# Patient Record
Sex: FEMALE | Race: WHITE | NOT HISPANIC OR LATINO | URBAN - METROPOLITAN AREA
[De-identification: names, ages, dates, MRNs, and addresses within clinical notes are randomized per-mention and may not be internally consistent; named-entity substitution may affect disease eponyms.]

---

## 2019-04-27 ENCOUNTER — EMERGENCY (EMERGENCY)
Facility: HOSPITAL | Age: 69
LOS: 1 days | Discharge: HOME | End: 2019-04-27
Attending: EMERGENCY MEDICINE | Admitting: EMERGENCY MEDICINE
Payer: MEDICARE

## 2019-04-27 VITALS
OXYGEN SATURATION: 100 % | TEMPERATURE: 98 F | RESPIRATION RATE: 14 BRPM | DIASTOLIC BLOOD PRESSURE: 74 MMHG | WEIGHT: 126.1 LBS | SYSTOLIC BLOOD PRESSURE: 136 MMHG | HEART RATE: 80 BPM

## 2019-04-27 DIAGNOSIS — Z90.710 ACQUIRED ABSENCE OF BOTH CERVIX AND UTERUS: Chronic | ICD-10-CM

## 2019-04-27 DIAGNOSIS — Z98.51 TUBAL LIGATION STATUS: Chronic | ICD-10-CM

## 2019-04-27 DIAGNOSIS — Z98.89 OTHER SPECIFIED POSTPROCEDURAL STATES: Chronic | ICD-10-CM

## 2019-04-27 DIAGNOSIS — Z90.49 ACQUIRED ABSENCE OF OTHER SPECIFIED PARTS OF DIGESTIVE TRACT: Chronic | ICD-10-CM

## 2019-04-27 DIAGNOSIS — Z87.898 PERSONAL HISTORY OF OTHER SPECIFIED CONDITIONS: Chronic | ICD-10-CM

## 2019-04-27 PROCEDURE — 99282 EMERGENCY DEPT VISIT SF MDM: CPT

## 2019-04-27 PROCEDURE — 99283 EMERGENCY DEPT VISIT LOW MDM: CPT

## 2019-04-27 NOTE — ED ADULT NURSE NOTE - NSIMPLEMENTINTERV_GEN_ALL_ED
Implemented All Universal Safety Interventions:  [place] to call system. Call bell, personal items and telephone within reach. Instruct patient to call for assistance. Room bathroom lighting operational. Non-slip footwear when patient is off stretcher. Physically safe environment: no spills, clutter or unnecessary equipment. Stretcher in lowest position, wheels locked, appropriate side rails in place.

## 2019-04-27 NOTE — ED ADULT NURSE NOTE - PMH
Diverticulitis large intestine    Diverticulosis    Hormone replacement therapy (HRT)    HTN (hypertension)    Hypothyroidism    Migraine    MVP (mitral valve prolapse)

## 2019-04-27 NOTE — ED PROVIDER NOTE - PSH
H/O abdominal hysterectomy  1992 and 1993 BSO  H/O shoulder surgery  2010  H/O urinary incontinence  TVT 2004  History of bilateral tubal ligation    History of laparoscopic cholecystectomy    S/P appendectomy  1962

## 2020-01-01 ENCOUNTER — EMERGENCY (EMERGENCY)
Facility: HOSPITAL | Age: 70
LOS: 1 days | Discharge: HOME | End: 2020-01-01
Attending: STUDENT IN AN ORGANIZED HEALTH CARE EDUCATION/TRAINING PROGRAM | Admitting: STUDENT IN AN ORGANIZED HEALTH CARE EDUCATION/TRAINING PROGRAM
Payer: MEDICARE

## 2020-01-01 VITALS
TEMPERATURE: 98 F | DIASTOLIC BLOOD PRESSURE: 75 MMHG | OXYGEN SATURATION: 96 % | RESPIRATION RATE: 15 BRPM | WEIGHT: 134.04 LBS | HEIGHT: 61 IN | HEART RATE: 77 BPM | SYSTOLIC BLOOD PRESSURE: 145 MMHG

## 2020-01-01 VITALS
DIASTOLIC BLOOD PRESSURE: 70 MMHG | OXYGEN SATURATION: 98 % | SYSTOLIC BLOOD PRESSURE: 134 MMHG | HEART RATE: 74 BPM | RESPIRATION RATE: 13 BRPM | TEMPERATURE: 97 F

## 2020-01-01 DIAGNOSIS — Z90.710 ACQUIRED ABSENCE OF BOTH CERVIX AND UTERUS: Chronic | ICD-10-CM

## 2020-01-01 DIAGNOSIS — Z87.898 PERSONAL HISTORY OF OTHER SPECIFIED CONDITIONS: Chronic | ICD-10-CM

## 2020-01-01 DIAGNOSIS — Z90.49 ACQUIRED ABSENCE OF OTHER SPECIFIED PARTS OF DIGESTIVE TRACT: Chronic | ICD-10-CM

## 2020-01-01 DIAGNOSIS — Z98.51 TUBAL LIGATION STATUS: Chronic | ICD-10-CM

## 2020-01-01 DIAGNOSIS — Z98.89 OTHER SPECIFIED POSTPROCEDURAL STATES: Chronic | ICD-10-CM

## 2020-01-01 PROCEDURE — 12001 RPR S/N/AX/GEN/TRNK 2.5CM/<: CPT

## 2020-01-01 PROCEDURE — 99282 EMERGENCY DEPT VISIT SF MDM: CPT

## 2020-01-01 PROCEDURE — 99282 EMERGENCY DEPT VISIT SF MDM: CPT | Mod: 25

## 2020-01-01 NOTE — ED PROVIDER NOTE - OBJECTIVE STATEMENT
68yo F pw lacerations. pt was cooking and cut her fingers with knife. left hand at base of third digit palmar surface and extends to proximal portion of 4th digit.   tetanus UTD> no numbness, no tingling, able to move all fingers

## 2020-01-01 NOTE — ED ADULT NURSE NOTE - NSIMPLEMENTINTERV_GEN_ALL_ED
Implemented All Universal Safety Interventions:  [place] to call system. Call bell, personal items and telephone within reach. Instruct patient to call for assistance. Room bathroom lighting operational. Non-slip footwear when patient is off stretcher. Physically safe environment: no spills, clutter or unnecessary equipment. Stretcher in lowest position, wheels locked, appropriate side rails in place.

## 2020-01-01 NOTE — ED PROVIDER NOTE - PATIENT PORTAL LINK FT
You can access the FollowMyHealth Patient Portal offered by [facility] by registering at the following website: [email or web address]. By joining [employer]’s FollowMyHealth portal, you will also be able to view your health information using other applications (apps) compatible with our system.

## 2020-01-01 NOTE — ED PROVIDER NOTE - NSFOLLOWUPINSTRUCTIONS_ED_ALL_ED_FT
1. TAKE ALL MEDICATIONS AS DIRECTED.    2. FOR PAIN OR FEVER YOU CAN TAKE IBUPROFEN (MOTRIN, ADVIL) OR ACETAMINOPHEN (TYLENOL) AS NEEDED, AS DIRECTED ON PACKAGING.  3. FOLLOW UP WITH YOUR PRIMARY DOCTOR WITHIN 5 DAYS AS DIRECTED.  4. IF YOU HAD LABS OR IMAGING DONE, YOU WERE GIVEN COPIES OF ALL LABS AND/OR IMAGING RESULTS FROM YOUR ER VISIT--PLEASE TAKE THEM WITH YOU TO YOUR FOLLOW UP APPOINTMENTS.  5. IF NEEDED, CALL PATIENT ACCESS SERVICES AT [phone] TO FIND A PRIMARY CARE PHYSICIAN.  OR CALL [phone] TO MAKE AN APPOINTMENT WITH THE CLINIC.  6. RETURN TO THE ER FOR ANY WORSENING SYMPTOMS OR CONCERNS.    Keep hand dry. apply bacitracin daily. Return for signs of infections  Return in 10-14 days for suture removal

## 2020-01-01 NOTE — ED PROVIDER NOTE - PHYSICAL EXAMINATION
Gen:  Well appear[place] in NAD  Head:  NC/AT  Resp: No distress   Ext: no deformities  Skin: left hand laceration 2 cm laceration extending from base of third digit on palmar aspect and extending betw finger and to dorsal aspect of distal 4th digit   + sensation intact, + flexion and extension intact

## 2020-01-16 ENCOUNTER — EMERGENCY (EMERGENCY)
Facility: HOSPITAL | Age: 70
LOS: 1 days | Discharge: HOME | End: 2020-01-16
Attending: EMERGENCY MEDICINE | Admitting: EMERGENCY MEDICINE
Payer: MEDICARE

## 2020-01-16 VITALS
OXYGEN SATURATION: 98 % | HEART RATE: 66 BPM | RESPIRATION RATE: 14 BRPM | TEMPERATURE: 98 F | DIASTOLIC BLOOD PRESSURE: 66 MMHG | SYSTOLIC BLOOD PRESSURE: 110 MMHG

## 2020-01-16 VITALS
HEIGHT: 62 IN | TEMPERATURE: 98 F | SYSTOLIC BLOOD PRESSURE: 104 MMHG | WEIGHT: 136.03 LBS | OXYGEN SATURATION: 95 % | RESPIRATION RATE: 18 BRPM | DIASTOLIC BLOOD PRESSURE: 65 MMHG | HEART RATE: 67 BPM

## 2020-01-16 DIAGNOSIS — Z90.710 ACQUIRED ABSENCE OF BOTH CERVIX AND UTERUS: Chronic | ICD-10-CM

## 2020-01-16 DIAGNOSIS — Z90.49 ACQUIRED ABSENCE OF OTHER SPECIFIED PARTS OF DIGESTIVE TRACT: Chronic | ICD-10-CM

## 2020-01-16 DIAGNOSIS — Z98.51 TUBAL LIGATION STATUS: Chronic | ICD-10-CM

## 2020-01-16 DIAGNOSIS — Z87.898 PERSONAL HISTORY OF OTHER SPECIFIED CONDITIONS: Chronic | ICD-10-CM

## 2020-01-16 DIAGNOSIS — Z98.89 OTHER SPECIFIED POSTPROCEDURAL STATES: Chronic | ICD-10-CM

## 2020-01-16 PROCEDURE — G0463: CPT

## 2020-01-16 NOTE — ED PROVIDER NOTE - CLINICAL SUMMARY MEDICAL DECISION MAKING FREE TEXT BOX
Pt is a 70 yo female here for suture removal cdi 7 sutures removed without issue possible 2 fell out no signs of infection

## 2020-01-16 NOTE — ED PROVIDER NOTE - OBJECTIVE STATEMENT
Pt is a 68 yo female with pmhx Diverticulitis large intestine HTN Hypothyroidism Migraine MVP here for suture removal. pt was cooking and cut her fingers with knife. left hand at base of third digit palmar surface and extends to proximal portion of 4th digit.  tetanus UTD> no numbness, no tingling, able to move all fingers no redness drainage fever sutures placed 1/1/20

## 2020-01-16 NOTE — ED ADULT TRIAGE NOTE - CHIEF COMPLAINT QUOTE
Pt reports that she needs her 9 stitches removed from her left hand which were placed on January 1. She denies pain, denies redness or swelling.

## 2020-01-16 NOTE — ED PROVIDER NOTE - ATTENDING CONTRIBUTION TO CARE
I have personally performed a face to face diagnostic evaluation on this patient.  I have reviewed the PA note and agree with the history, exam, and plan of care, except as noted.  History and Exam by me shows  here for suture removal c no other complaints.  Left hand wound c/d/i.  Sutures removed by PA

## 2020-01-16 NOTE — ED PROVIDER NOTE - PATIENT PORTAL LINK FT
You can access the FollowMyHealth Patient Portal offered by [facility] by registering at the following website: [email or web address]. By joining [email or web address]’s FollowMyHealth portal, you will also be able to view your health information using other applications (apps) compatible with our system.

## 2020-01-16 NOTE — ED PROVIDER NOTE - MUSCULOSKELETAL, MLM
Spine appears normal, range of motion is not limited, no muscle or joint tenderness left hand laceration 2 cm laceration extending from base of third digit on palmar aspect and extending between finger and to dorsal aspect of distal 4th digit cdi no redness drainage 7 sutures in place

## 2020-01-24 DIAGNOSIS — I34.1 NONRHEUMATIC MITRAL (VALVE) PROLAPSE: ICD-10-CM

## 2020-01-24 DIAGNOSIS — Z48.02 ENCOUNTER FOR REMOVAL OF SUTURES: ICD-10-CM

## 2020-01-24 DIAGNOSIS — E03.9 HYPOTHYROIDISM, UNSPECIFIED: ICD-10-CM

## 2020-01-24 DIAGNOSIS — K57.90 DIVERTICULOSIS OF INTESTINE, PART UNSPECIFIED, WITHOUT PERFORATION OR ABSCESS WITHOUT BLEEDING: ICD-10-CM

## 2020-01-24 DIAGNOSIS — I10 ESSENTIAL (PRIMARY) HYPERTENSION: ICD-10-CM

## 2020-01-24 DIAGNOSIS — Z88.0 ALLERGY STATUS TO PENICILLIN: ICD-10-CM

## 2022-11-08 ENCOUNTER — OFFICE (OUTPATIENT)
Dept: URBAN - METROPOLITAN AREA CLINIC 109 | Facility: CLINIC | Age: 72
Setting detail: OPHTHALMOLOGY
End: 2022-11-08
Payer: MEDICARE

## 2022-11-08 DIAGNOSIS — H02.832: ICD-10-CM

## 2022-11-08 DIAGNOSIS — H25.13: ICD-10-CM

## 2022-11-08 DIAGNOSIS — H02.835: ICD-10-CM

## 2022-11-08 DIAGNOSIS — H02.831: ICD-10-CM

## 2022-11-08 DIAGNOSIS — H16.221: ICD-10-CM

## 2022-11-08 DIAGNOSIS — H16.223: ICD-10-CM

## 2022-11-08 DIAGNOSIS — H02.834: ICD-10-CM

## 2022-11-08 DIAGNOSIS — H16.222: ICD-10-CM

## 2022-11-08 DIAGNOSIS — Z79.899: ICD-10-CM

## 2022-11-08 PROCEDURE — 92004 COMPRE OPH EXAM NEW PT 1/>: CPT | Performed by: OPHTHALMOLOGY

## 2022-11-08 PROCEDURE — 92250 FUNDUS PHOTOGRAPHY W/I&R: CPT | Performed by: OPHTHALMOLOGY

## 2022-11-08 PROCEDURE — 92134 CPTRZ OPH DX IMG PST SGM RTA: CPT | Performed by: OPHTHALMOLOGY

## 2022-11-08 ASSESSMENT — REFRACTION_CURRENTRX
OS_OVR_VA: 20/
OD_ADD: +2.50
OD_AXIS: 95
OS_AXIS: 30
OS_CYLINDER: -0.25
OS_ADD: +2.50
OD_SPHERE: +1.25
OD_OVR_VA: 20/
OD_CYLINDER: -0.50
OS_SPHERE: +1.25

## 2022-11-08 ASSESSMENT — REFRACTION_AUTOREFRACTION
OD_CYLINDER: -1.00
OS_SPHERE: +2.75
OD_AXIS: 96
OS_AXIS: 78
OS_CYLINDER: -0.25
OD_SPHERE: +3.25

## 2022-11-08 ASSESSMENT — CONFRONTATIONAL VISUAL FIELD TEST (CVF)
OS_FINDINGS: FULL
OD_FINDINGS: FULL

## 2022-11-08 ASSESSMENT — SPHEQUIV_DERIVED
OD_SPHEQUIV: 3
OS_SPHEQUIV: 2.625
OD_SPHEQUIV: 2.75

## 2022-11-08 ASSESSMENT — REFRACTION_MANIFEST
OD_SPHERE: +3.25
OD_AXIS: 95
OD_VA1: 20/20
OD_CYLINDER: -0.50
OS_ADD: +2.50
OD_ADD: +2.50
OS_VA1: 20/20
OS_SPHERE: +2.25

## 2022-11-08 ASSESSMENT — TONOMETRY
OS_IOP_MMHG: 17
OD_IOP_MMHG: 14

## 2022-11-08 ASSESSMENT — VISUAL ACUITY
OS_BCVA: 20/30
OD_BCVA: 20/25-2

## 2023-01-24 NOTE — ED ADULT NURSE NOTE - CADM POA URETHRAL CATHETER
Group Topic:  Group Psychotherapy    Date: 1/24/2023  Start Time: 11:00 AM  End Time: 11:50 AM  Facilitators: Lotus Tripathi [facility]    Focus:  Group Therapy  Number in attendance: 4  Session held virtually due to covid19 pandemic.   Group Therapy: Provide client with opportunity to explore and focus on life issues; identify coping skills, concerns related to mental health and receive feedback from others in group.       Method: Group  Attendance: Present  Participation: Active  Patient Response: Attentive  Mood: Normal  Affect: Type: Euthymic (normal mood)   Range: Restricted   Congruency: Congruent   Stability: Stable  Behavior/Socialization: Appropriate to group and Cooperative  Thought Process: Focused  Task Performance: Follows directions  Additional Information:  Psychosocial Stressors: Academic/Career and Interpersonal conflict  Symptom Notations: anxious thoughts, negative self talk  Patient Evaluation: Encouragement - needs prompts  Pt checked in to group with \"grateful\".  Pt shared feeling grateful for previous group and reported group as helpful due to being prompted to focus on pt's positive attributes challenging negative core beliefs.  Pt shared \"seeing things half full\" following previous group and reported noting progress in program.  Pt was also active in discussing hx of equating self worth with job/career and shared goal to reframe this.  Pt was active in giving support and feedback to others in group and encouraged others to be vulnerable.      No

## 2023-02-21 ENCOUNTER — OFFICE (OUTPATIENT)
Dept: URBAN - METROPOLITAN AREA CLINIC 109 | Facility: CLINIC | Age: 73
Setting detail: OPHTHALMOLOGY
End: 2023-02-21
Payer: MEDICARE

## 2023-02-21 DIAGNOSIS — H02.835: ICD-10-CM

## 2023-02-21 DIAGNOSIS — H16.221: ICD-10-CM

## 2023-02-21 DIAGNOSIS — H16.223: ICD-10-CM

## 2023-02-21 DIAGNOSIS — Z79.899: ICD-10-CM

## 2023-02-21 DIAGNOSIS — H02.834: ICD-10-CM

## 2023-02-21 DIAGNOSIS — H25.13: ICD-10-CM

## 2023-02-21 DIAGNOSIS — H16.222: ICD-10-CM

## 2023-02-21 DIAGNOSIS — H02.832: ICD-10-CM

## 2023-02-21 DIAGNOSIS — H02.831: ICD-10-CM

## 2023-02-21 PROCEDURE — 99213 OFFICE O/P EST LOW 20 MIN: CPT | Performed by: OPHTHALMOLOGY

## 2023-02-21 PROCEDURE — 92083 EXTENDED VISUAL FIELD XM: CPT | Performed by: OPHTHALMOLOGY

## 2023-02-21 ASSESSMENT — REFRACTION_MANIFEST
OD_CYLINDER: -0.50
OD_VA1: 20/20
OD_SPHERE: +3.25
OD_AXIS: 95
OD_ADD: +2.50
OS_VA1: 20/20
OS_SPHERE: +2.25
OS_ADD: +2.50

## 2023-02-21 ASSESSMENT — REFRACTION_CURRENTRX
OD_OVR_VA: 20/
OD_SPHERE: +1.25
OS_AXIS: 30
OD_CYLINDER: -0.50
OD_AXIS: 95
OS_SPHERE: +1.25
OS_CYLINDER: -0.25
OD_ADD: +2.50
OS_OVR_VA: 20/
OS_ADD: +2.50

## 2023-02-21 ASSESSMENT — REFRACTION_AUTOREFRACTION
OS_CYLINDER: -0.50
OD_CYLINDER: -0.50
OD_SPHERE: +2.75
OD_AXIS: 085
OS_SPHERE: +3.00
OS_AXIS: 085

## 2023-02-21 ASSESSMENT — TONOMETRY
OS_IOP_MMHG: 14
OD_IOP_MMHG: 13

## 2023-02-21 ASSESSMENT — CONFRONTATIONAL VISUAL FIELD TEST (CVF)
OS_FINDINGS: FULL
OD_FINDINGS: FULL

## 2023-02-21 ASSESSMENT — SPHEQUIV_DERIVED
OS_SPHEQUIV: 2.75
OD_SPHEQUIV: 3
OD_SPHEQUIV: 2.5

## 2023-02-21 ASSESSMENT — VISUAL ACUITY
OD_BCVA: 20/20-1
OS_BCVA: 20/20-1

## 2023-06-20 ENCOUNTER — OFFICE (OUTPATIENT)
Dept: URBAN - METROPOLITAN AREA CLINIC 109 | Facility: CLINIC | Age: 73
Setting detail: OPHTHALMOLOGY
End: 2023-06-20
Payer: MEDICARE

## 2023-06-20 DIAGNOSIS — H02.835: ICD-10-CM

## 2023-06-20 DIAGNOSIS — H02.834: ICD-10-CM

## 2023-06-20 DIAGNOSIS — H25.13: ICD-10-CM

## 2023-06-20 DIAGNOSIS — H16.223: ICD-10-CM

## 2023-06-20 DIAGNOSIS — H02.832: ICD-10-CM

## 2023-06-20 DIAGNOSIS — Z79.899: ICD-10-CM

## 2023-06-20 DIAGNOSIS — H16.222: ICD-10-CM

## 2023-06-20 DIAGNOSIS — H16.221: ICD-10-CM

## 2023-06-20 DIAGNOSIS — H02.831: ICD-10-CM

## 2023-06-20 PROCEDURE — 99213 OFFICE O/P EST LOW 20 MIN: CPT | Performed by: OPHTHALMOLOGY

## 2023-06-20 ASSESSMENT — REFRACTION_CURRENTRX
OD_OVR_VA: 20/
OD_ADD: +2.50
OD_AXIS: 95
OS_AXIS: 30
OS_SPHERE: +1.25
OS_ADD: +2.50
OS_CYLINDER: -0.25
OD_SPHERE: +1.25
OD_CYLINDER: -0.50
OS_OVR_VA: 20/

## 2023-06-20 ASSESSMENT — REFRACTION_MANIFEST
OD_AXIS: 95
OD_ADD: +2.50
OS_ADD: +2.50
OD_CYLINDER: -0.50
OD_SPHERE: +3.25
OS_SPHERE: +2.25
OD_VA1: 20/20
OS_VA1: 20/20

## 2023-06-20 ASSESSMENT — REFRACTION_AUTOREFRACTION
OD_CYLINDER: -1.00
OS_CYLINDER: -0.75
OS_AXIS: 88
OD_AXIS: 95
OD_SPHERE: +3.50
OS_SPHERE: +3.25

## 2023-06-20 ASSESSMENT — SPHEQUIV_DERIVED
OS_SPHEQUIV: 2.875
OD_SPHEQUIV: 3
OD_SPHEQUIV: 3

## 2023-06-20 ASSESSMENT — CONFRONTATIONAL VISUAL FIELD TEST (CVF)
OS_FINDINGS: FULL
OD_FINDINGS: FULL

## 2023-06-20 ASSESSMENT — TONOMETRY
OD_IOP_MMHG: 15
OS_IOP_MMHG: 15

## 2023-06-20 ASSESSMENT — VISUAL ACUITY
OD_BCVA: 20/20-2
OS_BCVA: 20/20-1

## 2024-05-29 ENCOUNTER — OFFICE (OUTPATIENT)
Dept: URBAN - METROPOLITAN AREA CLINIC 109 | Facility: CLINIC | Age: 74
Setting detail: OPHTHALMOLOGY
End: 2024-05-29
Payer: MEDICARE

## 2024-05-29 DIAGNOSIS — H02.831: ICD-10-CM

## 2024-05-29 DIAGNOSIS — H16.222: ICD-10-CM

## 2024-05-29 DIAGNOSIS — H16.221: ICD-10-CM

## 2024-05-29 DIAGNOSIS — H02.835: ICD-10-CM

## 2024-05-29 DIAGNOSIS — H02.832: ICD-10-CM

## 2024-05-29 DIAGNOSIS — Z79.899: ICD-10-CM

## 2024-05-29 DIAGNOSIS — H25.13: ICD-10-CM

## 2024-05-29 DIAGNOSIS — H02.834: ICD-10-CM

## 2024-05-29 DIAGNOSIS — H16.223: ICD-10-CM

## 2024-05-29 PROCEDURE — 92250 FUNDUS PHOTOGRAPHY W/I&R: CPT | Performed by: OPHTHALMOLOGY

## 2024-05-29 PROCEDURE — 92134 CPTRZ OPH DX IMG PST SGM RTA: CPT | Performed by: OPHTHALMOLOGY

## 2024-05-29 PROCEDURE — 92014 COMPRE OPH EXAM EST PT 1/>: CPT | Performed by: OPHTHALMOLOGY

## 2024-05-29 ASSESSMENT — CONFRONTATIONAL VISUAL FIELD TEST (CVF)
OD_FINDINGS: FULL
OS_FINDINGS: FULL

## 2024-11-20 ENCOUNTER — OFFICE (OUTPATIENT)
Dept: URBAN - METROPOLITAN AREA CLINIC 109 | Facility: CLINIC | Age: 74
Setting detail: OPHTHALMOLOGY
End: 2024-11-20
Payer: MEDICARE

## 2024-11-20 DIAGNOSIS — H16.223: ICD-10-CM

## 2024-11-20 DIAGNOSIS — H02.831: ICD-10-CM

## 2024-11-20 DIAGNOSIS — H02.832: ICD-10-CM

## 2024-11-20 DIAGNOSIS — H16.221: ICD-10-CM

## 2024-11-20 DIAGNOSIS — Z79.899: ICD-10-CM

## 2024-11-20 DIAGNOSIS — H25.13: ICD-10-CM

## 2024-11-20 DIAGNOSIS — H16.222: ICD-10-CM

## 2024-11-20 DIAGNOSIS — H02.835: ICD-10-CM

## 2024-11-20 DIAGNOSIS — H02.834: ICD-10-CM

## 2024-11-20 PROCEDURE — 92083 EXTENDED VISUAL FIELD XM: CPT | Performed by: OPHTHALMOLOGY

## 2024-11-20 PROCEDURE — 92014 COMPRE OPH EXAM EST PT 1/>: CPT | Performed by: OPHTHALMOLOGY

## 2024-11-20 ASSESSMENT — TONOMETRY
OD_IOP_MMHG: 19
OS_IOP_MMHG: 16
OS_IOP_MMHG: 14
OD_IOP_MMHG: 16

## 2024-11-20 ASSESSMENT — CONFRONTATIONAL VISUAL FIELD TEST (CVF)
OS_FINDINGS: FULL
OD_FINDINGS: FULL

## 2024-11-20 ASSESSMENT — REFRACTION_CURRENTRX
OD_CYLINDER: -0.50
OD_OVR_VA: 20/
OS_ADD: +2.50
OD_SPHERE: +1.25
OS_OVR_VA: 20/
OS_CYLINDER: -0.25
OD_AXIS: 95
OS_AXIS: 30
OS_SPHERE: +1.25
OD_ADD: +2.50

## 2024-11-20 ASSESSMENT — REFRACTION_AUTOREFRACTION
OD_AXIS: 077
OD_CYLINDER: -0.75
OS_AXIS: 084
OD_SPHERE: +3.00
OS_SPHERE: +3.25
OS_CYLINDER: -1.25

## 2024-11-20 ASSESSMENT — REFRACTION_MANIFEST
OD_VA1: 20/20
OD_CYLINDER: -0.50
OD_SPHERE: +3.25
OD_AXIS: 95
OS_ADD: +2.50
OD_ADD: +2.50
OS_VA1: 20/20
OS_SPHERE: +2.25

## 2024-11-20 ASSESSMENT — VISUAL ACUITY
OS_BCVA: 20/25+2
OD_BCVA: 20/20-1

## 2025-01-09 NOTE — ED ADULT NURSE NOTE - CAS EDN INTEG ASSESS
Performing Laboratory: 0 Billing Type: Third-Party Bill Bill For Surgical Tray: no Expected Date Of Service: 01/09/2025 - - -

## 2025-06-04 ENCOUNTER — OFFICE (OUTPATIENT)
Dept: URBAN - METROPOLITAN AREA CLINIC 109 | Facility: CLINIC | Age: 75
Setting detail: OPHTHALMOLOGY
End: 2025-06-04
Payer: MEDICARE

## 2025-06-04 DIAGNOSIS — H02.832: ICD-10-CM

## 2025-06-04 DIAGNOSIS — H52.4: ICD-10-CM

## 2025-06-04 DIAGNOSIS — Z79.899: ICD-10-CM

## 2025-06-04 DIAGNOSIS — H25.13: ICD-10-CM

## 2025-06-04 DIAGNOSIS — H02.831: ICD-10-CM

## 2025-06-04 DIAGNOSIS — R94.6: ICD-10-CM

## 2025-06-04 DIAGNOSIS — H16.223: ICD-10-CM

## 2025-06-04 PROBLEM — H52.7 REFRACTIVE ERROR: Status: ACTIVE | Noted: 2025-06-04

## 2025-06-04 PROCEDURE — 92250 FUNDUS PHOTOGRAPHY W/I&R: CPT | Performed by: OPHTHALMOLOGY

## 2025-06-04 PROCEDURE — 92014 COMPRE OPH EXAM EST PT 1/>: CPT | Performed by: OPHTHALMOLOGY

## 2025-06-04 PROCEDURE — 92134 CPTRZ OPH DX IMG PST SGM RTA: CPT | Performed by: OPHTHALMOLOGY

## 2025-06-04 PROCEDURE — 92015 DETERMINE REFRACTIVE STATE: CPT | Performed by: OPHTHALMOLOGY

## 2025-06-04 ASSESSMENT — REFRACTION_CURRENTRX
OS_CYLINDER: -0.25
OS_SPHERE: +1.25
OD_SPHERE: +1.25
OS_ADD: +2.50
OS_AXIS: 30
OD_ADD: +2.50
OD_OVR_VA: 20/
OD_AXIS: 95
OD_CYLINDER: -0.50
OS_OVR_VA: 20/

## 2025-06-04 ASSESSMENT — REFRACTION_MANIFEST
OS_ADD: +2.50
OD_AXIS: 95
OD_ADD: +2.50
OS_SPHERE: +2.25
OD_SPHERE: +2.25
OD_CYLINDER: -0.50
OS_VA1: 20/20
OD_VA1: 20/20-2

## 2025-06-04 ASSESSMENT — TONOMETRY
OD_IOP_MMHG: 16
OS_IOP_MMHG: 16
OS_IOP_MMHG: 15
OD_IOP_MMHG: 11

## 2025-06-04 ASSESSMENT — CONFRONTATIONAL VISUAL FIELD TEST (CVF)
OS_FINDINGS: FULL
OD_FINDINGS: FULL

## 2025-06-04 ASSESSMENT — REFRACTION_AUTOREFRACTION
OD_AXIS: 80
OD_CYLINDER: -0.75
OS_SPHERE: +3.50
OD_SPHERE: +3.00
OS_CYLINDER: -1.25
OS_AXIS: 75

## 2025-06-04 ASSESSMENT — VISUAL ACUITY
OS_BCVA: 20/50-
OD_BCVA: 20/20